# Patient Record
Sex: MALE | Race: WHITE | NOT HISPANIC OR LATINO
[De-identification: names, ages, dates, MRNs, and addresses within clinical notes are randomized per-mention and may not be internally consistent; named-entity substitution may affect disease eponyms.]

---

## 2017-08-29 PROBLEM — Z00.00 ENCOUNTER FOR PREVENTIVE HEALTH EXAMINATION: Status: ACTIVE | Noted: 2017-08-29

## 2017-10-05 ENCOUNTER — APPOINTMENT (OUTPATIENT)
Dept: ORTHOPEDIC SURGERY | Facility: CLINIC | Age: 47
End: 2017-10-05
Payer: MEDICARE

## 2017-10-05 VITALS
HEART RATE: 93 BPM | BODY MASS INDEX: 38.65 KG/M2 | WEIGHT: 270 LBS | HEIGHT: 70 IN | SYSTOLIC BLOOD PRESSURE: 141 MMHG | DIASTOLIC BLOOD PRESSURE: 89 MMHG

## 2017-10-05 DIAGNOSIS — Z87.891 PERSONAL HISTORY OF NICOTINE DEPENDENCE: ICD-10-CM

## 2017-10-05 DIAGNOSIS — Z82.61 FAMILY HISTORY OF ARTHRITIS: ICD-10-CM

## 2017-10-05 DIAGNOSIS — Z80.9 FAMILY HISTORY OF MALIGNANT NEOPLASM, UNSPECIFIED: ICD-10-CM

## 2017-10-05 DIAGNOSIS — Z82.62 FAMILY HISTORY OF OSTEOPOROSIS: ICD-10-CM

## 2017-10-05 DIAGNOSIS — M51.26 OTHER INTERVERTEBRAL DISC DISPLACEMENT, LUMBAR REGION: ICD-10-CM

## 2017-10-05 DIAGNOSIS — Z87.39 PERSONAL HISTORY OF OTHER DISEASES OF THE MUSCULOSKELETAL SYSTEM AND CONNECTIVE TISSUE: ICD-10-CM

## 2017-10-05 DIAGNOSIS — M54.5 LOW BACK PAIN: ICD-10-CM

## 2017-10-05 DIAGNOSIS — Z78.9 OTHER SPECIFIED HEALTH STATUS: ICD-10-CM

## 2017-10-05 PROCEDURE — 99204 OFFICE O/P NEW MOD 45 MIN: CPT

## 2017-10-05 PROCEDURE — 72100 X-RAY EXAM L-S SPINE 2/3 VWS: CPT

## 2017-10-05 RX ORDER — FENTANYL 87.5 UG/H
PATCH, EXTENDED RELEASE TRANSDERMAL
Refills: 0 | Status: ACTIVE | COMMUNITY

## 2017-10-05 RX ORDER — OXYCODONE 10 MG/1
10 TABLET ORAL
Refills: 0 | Status: ACTIVE | COMMUNITY

## 2017-10-05 RX ORDER — ALPRAZOLAM 2 MG/1
TABLET ORAL
Refills: 0 | Status: ACTIVE | COMMUNITY

## 2017-10-05 RX ORDER — OMEPRAZOLE 40 MG/1
40 CAPSULE, DELAYED RELEASE ORAL
Refills: 0 | Status: ACTIVE | COMMUNITY

## 2017-10-08 ENCOUNTER — FORM ENCOUNTER (OUTPATIENT)
Age: 47
End: 2017-10-08

## 2017-10-09 ENCOUNTER — APPOINTMENT (OUTPATIENT)
Dept: MRI IMAGING | Facility: CLINIC | Age: 47
End: 2017-10-09
Payer: MEDICARE

## 2017-10-09 ENCOUNTER — OUTPATIENT (OUTPATIENT)
Dept: OUTPATIENT SERVICES | Facility: HOSPITAL | Age: 47
LOS: 1 days | End: 2017-10-09
Payer: MEDICARE

## 2017-10-09 DIAGNOSIS — M51.36 OTHER INTERVERTEBRAL DISC DEGENERATION, LUMBAR REGION: ICD-10-CM

## 2017-10-09 PROCEDURE — A9585: CPT

## 2017-10-09 PROCEDURE — 72158 MRI LUMBAR SPINE W/O & W/DYE: CPT

## 2017-10-09 PROCEDURE — 72158 MRI LUMBAR SPINE W/O & W/DYE: CPT | Mod: 26

## 2017-10-31 ENCOUNTER — APPOINTMENT (OUTPATIENT)
Dept: ORTHOPEDIC SURGERY | Facility: CLINIC | Age: 47
End: 2017-10-31
Payer: MEDICARE

## 2017-10-31 VITALS
SYSTOLIC BLOOD PRESSURE: 116 MMHG | BODY MASS INDEX: 38.65 KG/M2 | DIASTOLIC BLOOD PRESSURE: 79 MMHG | HEIGHT: 70 IN | HEART RATE: 74 BPM | WEIGHT: 270 LBS

## 2017-10-31 DIAGNOSIS — M51.36 OTHER INTERVERTEBRAL DISC DEGENERATION, LUMBAR REGION: ICD-10-CM

## 2017-10-31 DIAGNOSIS — M48.00 SPINAL STENOSIS, SITE UNSPECIFIED: ICD-10-CM

## 2017-10-31 DIAGNOSIS — F41.9 ANXIETY DISORDER, UNSPECIFIED: ICD-10-CM

## 2017-10-31 DIAGNOSIS — C18.1 MALIGNANT NEOPLASM OF APPENDIX: ICD-10-CM

## 2017-10-31 DIAGNOSIS — Z98.1 ARTHRODESIS STATUS: ICD-10-CM

## 2017-10-31 PROCEDURE — 99215 OFFICE O/P EST HI 40 MIN: CPT

## 2018-07-26 PROBLEM — Z78.9 ALCOHOL USE: Status: INACTIVE | Noted: 2017-10-05

## 2018-09-10 ENCOUNTER — OTHER (OUTPATIENT)
Age: 48
End: 2018-09-10

## 2018-09-12 ENCOUNTER — OTHER (OUTPATIENT)
Age: 48
End: 2018-09-12

## 2020-03-06 ENCOUNTER — APPOINTMENT (OUTPATIENT)
Dept: ORTHOPEDIC SURGERY | Facility: CLINIC | Age: 50
End: 2020-03-06
Payer: MEDICARE

## 2020-03-06 VITALS
SYSTOLIC BLOOD PRESSURE: 142 MMHG | HEIGHT: 70 IN | HEART RATE: 106 BPM | DIASTOLIC BLOOD PRESSURE: 92 MMHG | BODY MASS INDEX: 38.65 KG/M2 | WEIGHT: 270 LBS

## 2020-03-06 DIAGNOSIS — M16.11 UNILATERAL PRIMARY OSTEOARTHRITIS, RIGHT HIP: ICD-10-CM

## 2020-03-06 PROCEDURE — 99203 OFFICE O/P NEW LOW 30 MIN: CPT

## 2020-03-06 PROCEDURE — 73502 X-RAY EXAM HIP UNI 2-3 VIEWS: CPT | Mod: RT

## 2020-03-06 NOTE — HISTORY OF PRESENT ILLNESS
[Pain Location] : pain [Stable] : stable [___ yrs] : [unfilled] year(s) ago [6] : a current pain level of 6/10 [de-identified] : 49 year old male here for evaluation of right hip pain. He has extensive hx of low back pain with 5 lumbar surgeries. The last surgery was 1/31/20. Pt reports pain for 1.5 years in right lateral lower back and hip area. The pain is 6/10 in intensity. The pain is constant and throbbing. Rest and pain medication ( fentalyn 100mcg Q 2 days, gabapentin, and muscle relaxant) helps. He is walking with walker. Pt is undergoing home PT from recent back operation.

## 2020-03-06 NOTE — ADDENDUM
[FreeTextEntry1] : I, Quentin Alvarez, acted solely as a scribe for Dr. Sonny Parks on this date 03/06/2020.

## 2020-03-06 NOTE — PHYSICAL EXAM
[LE] : Sensory: Intact in bilateral lower extremities [ALL] : dorsalis pedis, posterior tibial, femoral, popliteal, and radial 2+ and symmetric bilaterally [Walker] : ambulates with walker [Antalgic] : antalgic [de-identified] : GENERAL APPEARANCE: Well nourished and hydrated, pleasant, alert, and oriented x 3. Appears their stated age. \par HEENT: Normocephalic, extraocular eye motion intact. Nasal septum midline. Oral cavity clear. External auditory canal clear. \par RESPIRATORY: Breath sounds clear and audible in all lobes. No wheezing, No accessory muscle use.\par CARDIOVASCULAR: No apparent abnormalities. No lower leg edema. No varicosities. Pedal pulses are palpable.\par NEUROLOGIC: Sensation is normal, no muscle weakness in the upper or lower extremities.\par DERMATOLOGIC: No apparent skin lesions, moist, warm, no rash.\par SPINE: Cervical spine appears normal and moves freely; thoracic spine appears normal and moves freely; lumbosacral spine appears normal and moves freely, normal, nontender.\par MUSCULOSKELETAL: Hands, wrists, and elbows are normal and move freely, shoulders are normal and move freely.  [de-identified] : Right hip exam shows no groin pain with gentle log roll, no pain with palpation over greater trochanteric bursa [de-identified] : 3V Xray of the right hip and pelvis done in office today and reviewed by Dr. Sonny Parks demonstrates well-preserved right hip joint without findings or any evidence of fracture, dislocation, or any other acute orthopedic pathology. There is no radiographic features of severe OA present. \par \par \par

## 2020-03-06 NOTE — DISCUSSION/SUMMARY
[de-identified] : His hip examination and x-rays are reassuring. His right-sided low back pain and right lower extremity pain are likely referred from his lumbar spine. I recommended that he continue care with his spine surgeon.

## 2021-07-07 ENCOUNTER — EMERGENCY (EMERGENCY)
Facility: HOSPITAL | Age: 51
LOS: 1 days | Discharge: HOME | End: 2021-07-07
Attending: EMERGENCY MEDICINE
Payer: MEDICARE

## 2021-07-07 VITALS
SYSTOLIC BLOOD PRESSURE: 155 MMHG | TEMPERATURE: 99 F | DIASTOLIC BLOOD PRESSURE: 97 MMHG | WEIGHT: 250 LBS | OXYGEN SATURATION: 97 % | HEART RATE: 88 BPM | RESPIRATION RATE: 17 BRPM

## 2021-07-07 DIAGNOSIS — Z98.1 ARTHRODESIS STATUS: Chronic | ICD-10-CM

## 2021-07-07 DIAGNOSIS — Z90.49 ACQUIRED ABSENCE OF OTHER SPECIFIED PARTS OF DIGESTIVE TRACT: Chronic | ICD-10-CM

## 2021-07-07 DIAGNOSIS — C18.1 MALIGNANT NEOPLASM OF APPENDIX: Chronic | ICD-10-CM

## 2021-07-07 PROCEDURE — 99283 EMERGENCY DEPT VISIT LOW MDM: CPT

## 2021-07-07 NOTE — ED PROVIDER NOTE - OBJECTIVE STATEMENT
50 yo male pmh chronic back pain on pain meds requesting detox off of medications; pt admits to seeing pain management  who has placed patient on lowering doses of suboxone;   + nausea without vomiting;

## 2021-07-07 NOTE — ED ADULT TRIAGE NOTE - CHIEF COMPLAINT QUOTE
Patient states that he is currently under pain management for his chronic back pain. Pt states that he is taking fentanyl and has been "getting out of control" lately. Pt states that he is using double the dose of his fentanyl patches to control his pain and to prevent withdrawal. Pt would like to detox from his opiates.

## 2021-07-07 NOTE — ED ADULT NURSE NOTE - OBJECTIVE STATEMENT
Patient has been abusing his Fentanyl patch from pain management, pt has been struggling with opioids addiction for many years, pt has multiple patches on from different dates at this time A&Ox4 VSS no acute distress. Pt wants to have detox placement.

## 2021-07-07 NOTE — ED PROVIDER NOTE - PATIENT PORTAL LINK FT
You can access the FollowMyHealth Patient Portal offered by [facility] by registering at the following website: [email or web address]. By joining [facility]’s FollowMyHealth portal, you will also be able to view your health information using other applications (apps) compatible with our system.

## 2021-07-07 NOTE — PATIENT PROFILE ADULT - LIVING ENVIRONMENT
report received from KHARI malone. pt is AAO x 3. pt is stable and appears comfortable. pt has no complaints at this time. no acute distress noted. awaiting transport to unit. will continue to monitor.
no

## 2021-07-07 NOTE — ED ADULT NURSE NOTE - PSH
Carcinoma of appendix    History of bowel resection    History of lumbar spinal fusion    History of thoracic spinal fusion    S/P appendectomy

## 2022-07-20 ENCOUNTER — RESULT REVIEW (OUTPATIENT)
Age: 52
End: 2022-07-20